# Patient Record
Sex: MALE | Race: WHITE | URBAN - METROPOLITAN AREA
[De-identification: names, ages, dates, MRNs, and addresses within clinical notes are randomized per-mention and may not be internally consistent; named-entity substitution may affect disease eponyms.]

---

## 2018-05-09 ENCOUNTER — OFFICE VISIT (OUTPATIENT)
Dept: URGENT CARE | Facility: URGENT CARE | Age: 43
End: 2018-05-09
Payer: COMMERCIAL

## 2018-05-09 VITALS
SYSTOLIC BLOOD PRESSURE: 114 MMHG | BODY MASS INDEX: 29.05 KG/M2 | TEMPERATURE: 98 F | HEART RATE: 78 BPM | WEIGHT: 217.2 LBS | RESPIRATION RATE: 18 BRPM | DIASTOLIC BLOOD PRESSURE: 75 MMHG

## 2018-05-09 DIAGNOSIS — H93.8X2 EAR PRESSURE, LEFT: ICD-10-CM

## 2018-05-09 DIAGNOSIS — H93.12 TINNITUS, LEFT: Primary | ICD-10-CM

## 2018-05-09 PROCEDURE — 99213 OFFICE O/P EST LOW 20 MIN: CPT | Performed by: FAMILY MEDICINE

## 2018-05-09 NOTE — PROGRESS NOTES
SUBJECTIVE:Kendell Noel is a 42 year old male who presents with left ear fullness and ringing for 2 month(s).   Severity: moderate   Timing:still present  Additional symptoms include none.    History of recurrent otitis: no    No past medical history on file.  No Known Allergies  Social History   Substance Use Topics     Smoking status: Former Smoker     Types: Cigarettes     Smokeless tobacco: Never Used      Comment: Smoked in high school     Alcohol use 0.5 - 1.0 oz/week     1 - 2 drink(s) per week       ROS: CONSTITUTIONAL:NEGATIVE for fever, chills, change in weight    OBJECTIVE:  /75  Pulse 78  Temp 98  F (36.7  C) (Oral)  Resp 18  Wt 217 lb 3.2 oz (98.5 kg)  BMI 29.05 kg/m2   The right TM is normal: no effusions, no erythema, and normal landmarks     The right auditory canal is normal and without drainage, edema or erythema  The left TM is normal: no effusions, no erythema, and normal landmarks  The left auditory canal is normal and without drainage, edema or erythema  Oropharynx exam is normal: no lesions, erythema, adenopathy or exudate.GENERAL: no acute distress  EYES: EOMI,  PERRL, conjunctiva clear  NECK: supple, non-tender to palpation, no adenopathy noted  SKIN: no suspicious lesions or rashes       ICD-10-CM    1. Tinnitus, left H93.12 OTOLARYNGOLOGY REFERRAL   2. Ear pressure, left H93.8X2 OTOLARYNGOLOGY REFERRAL     F/U PCP/IM/FP/UC if worse, not any better

## 2018-05-09 NOTE — PATIENT INSTRUCTIONS
Tinnitus (Ringing in the Ears)     Treatment may include maskers and hearing aids.     Tinnitus is the term for a noise in your ear not caused by an outside sound. The noise might be a ringing, clicking, hiss, or roar. It can vary in pitch and may be soft or quite loud. For some people, tinnitus is a minor nuisance. But for others, the noise can make it hard to hear, work, and even sleep. When tinnitus can't be cured, a number of treatments may offer relief.  What causes tinnitus?  Loud noises, hearing loss, and ear wax can cause tinnitus. So can certain medicines. Large amounts of aspirin or caffeine are sometimes to blame. In many cases, the exact cause of tinnitus is unknown.  How is tinnitus treated?  Identifying and removing the cause is the best way to treat tinnitus. For that reason, your healthcare provider may refer you to an otolaryngologist (ear, nose, and throat doctor). Your hearing may also be checked by an audiologist (hearing specialist). If you have hearing loss, wearing a hearing aid may help your tinnitus. When the cause can't be found, the tinnitus itself may be treated. Some of the treatments are listed below, and your healthcare provider can tell you more about them:    Maskers are small devices that look like hearing aids. They emit a pleasant sound that helps cover up the ringing in your ears. Hearing aids and maskers are sometimes used together.    Medicines that treat anxiety and depression may ease tinnitus in some people.    Hypnosis or relaxation therapy may help head noise seem less severe.    Tinnitus retraining therapy combines counseling and maskers. Both can help take your mind off the tinnitus.  For more information    American Speech-Hearing-Language Association [phone] www.bhavesh.org    American Tinnitus Association [phone] www.gavino.org    National [place] on Deafness and other Communication Disorders [phone] www.nidcd.nih.gov   Date Last Reviewed: 7/1/2016     7439-9692 [employer]. [address] [place] [address], [place] [address]. All rights reserved. This information is not intended as a substitute for professional medical care. Always follow your healthcare professional's instructions.

## 2018-05-09 NOTE — MR AVS SNAPSHOT
After Visit Summary   5/9/2018    Kendell Noel    MRN: [id number]           Patient Information     Date Of Birth          1975        Visit Information        Provider Department      5/9/2018 10:40 AM Brandon Rangel DO [facility]        Today's Diagnoses     Tinnitus, left    -  1    Ear pressure, left          Care Instructions      Tinnitus (Ringing in the Ears)     Treatment may include maskers and hearing aids.     Tinnitus is the term for a noise in your ear not caused by an outside sound. The noise might be a ringing, clicking, hiss, or roar. It can vary in pitch and may be soft or quite loud. For some people, tinnitus is a minor nuisance. But for others, the noise can make it hard to hear, work, and even sleep. When tinnitus can't be cured, a number of treatments may offer relief.  What causes tinnitus?  Loud noises, hearing loss, and ear wax can cause tinnitus. So can certain medicines. Large amounts of aspirin or caffeine are sometimes to blame. In many cases, the exact cause of tinnitus is unknown.  How is tinnitus treated?  Identifying and removing the cause is the best way to treat tinnitus. For that reason, your healthcare provider may refer you to an otolaryngologist (ear, nose, and throat doctor). Your hearing may also be checked by an audiologist (hearing specialist). If you have hearing loss, wearing a hearing aid may help your tinnitus. When the cause can't be found, the tinnitus itself may be treated. Some of the treatments are listed below, and your healthcare provider can tell you more about them:    Maskers are small devices that look like hearing aids. They emit a pleasant sound that helps cover up the ringing in your ears. Hearing aids and maskers are sometimes used together.    Medicines that treat anxiety and depression may ease tinnitus in some people.    Hypnosis or relaxation therapy may help head noise seem less severe.    Tinnitus  retraining therapy combines counseling and maskers. Both can help take your mind off the tinnitus.  For more information    American Speech-Hearing-Language Association [phone] www.bhavesh.org    American Tinnitus Association [phone] www.gavino.org    National [place] on Deafness and other Communication Disorders 636-078-7066 www.nidcd.nih.gov   Date Last Reviewed: 7/1/2016 2000-2017 [employer]. [address] [place] [address]. All rights reserved. This information is not intended as a substitute for professional medical care. Always follow your healthcare professional's instructions.                Follow-ups after your visit        Additional Services     OTOLARYNGOLOGY REFERRAL       Your provider has referred you to: [facility]: Adult [facility], Nose [facility]roat [facility] ([place]tol[place]ology) - [place] [phone]  [email or web address]  [facility]: [facility] Nose and Throat Clinic and [facility] - [place] [phone]   [email or web address]  [facility]: [facility] [phone]   [email or web address]  [place] [phone]   [email or web address]  N: [place] [facility] [phone]   [email or web address]  [facility]: [facility] Otolaryngology - [place] [phone]   [email or web address]    Please be aware that coverage of these services is subject to the terms and limitations of your health insurance plan.  Call member services at your health plan with any benefit or coverage questions.      Please bring the following with you to your appointment:    (1) Any X-Rays, CTs or MRIs which have been performed.  Contact the facility where they were done to arrange for  prior to your scheduled appointment.   (2) List of current medications  (3) This referral request   (4) Any documents/labs given to you for this referral       "            Who to contact     If you have questions or need follow up information about today's clinic visit or your schedule please contact [facility] directly at [phone].  Normal or non-critical lab and imaging results will be communicated to you by MyChart, letter or phone within 4 business days after the clinic has received the results. If you do not hear from us within 7 days, please contact the clinic through MyChart or phone. If you have a critical or abnormal lab result, we will notify you by phone as soon as possible.  Submit refill requests through [employer] or call your pharmacy and they will forward the refill request to us. Please allow 3 business days for your refill to be completed.          Additional Information About Your Visit        [facility] Information     [employer] lets you send messages to your doctor, view your test results, renew your prescriptions, schedule appointments and more. To sign up, go to [email or web address] . Click on \"Log in\" on the left side of the screen, which will take you to the Welcome page. Then click on \"Sign up Now\" on the right side of the page.     You will be asked to enter the access code listed below, as well as some personal information. Please follow the directions to create your username and password.     Your access code is: [email or web address]  Expires: 2018 11:09 AM     Your access code will  in 90 days. If you need help or a new code, please call your [place] clinic or [phone].        Care EveryWhere ID     This is your Care EveryWhere ID. This could be used by other organizations to access your [place] medical records  [id number]        Your Vitals Were     Pulse Temperature Respirations BMI (Body Mass Index)          78 98  F (36.7  C) (Oral) 18 29.05 kg/m2         Blood Pressure from Last 3 Encounters:   18 114/75   16 122/76   10/19/15 110/72    Weight from Last 3 Encounters:   18 217 lb 3.2 " oz (98.5 kg)   02/24/16 224 lb (101.6 kg)   10/19/15 221 lb 14.4 oz (100.7 kg)              We Performed the Following     OTOLARYNGOLOGY REFERRAL        Primary Care Provider Fax #    Physician No Ref-Primary [phone]       No address on file        Equal Access to Services     BRIAN HARO : Hadii aad ku hadasho Soomaali, waaxda luqadaha, qaybta kaalmada adeegyada, waxpablo lotusin israeln adeclaudia renato wendi . So [facility] [phone].    ATENCIÓN: Si habla español, tiene a gallegos disposición servicios gratuitos de asistencia lingüística. Llame al [phone].    We comply with applicable federal civil rights laws and [place] laws. We do not discriminate on the basis of race, color, national origin, age, disability, sex, sexual orientation, or gender identity.            Thank you!     Thank you for choosing [facility]  for your care. Our goal is always to provide you with excellent care. Hearing back from our patients is one way we can continue to improve our services. Please take a few minutes to complete the written survey that you may receive in the mail after your visit with us. Thank you!             Your Updated Medication List - Protect others around you: Learn how to safely use, store and throw away your medicines at www.disposemymeds.org.          This list is accurate as of 5/9/18 11:24 AM.  Always use your most recent med list.                   Brand Name Dispense Instructions for use Diagnosis    DAYQUIL OR           ibuprofen 200 MG tablet    ADVIL/MOTRIN     Take 400 mg by mouth every 4 hours as needed for mild pain